# Patient Record
Sex: MALE | Race: ASIAN | URBAN - METROPOLITAN AREA
[De-identification: names, ages, dates, MRNs, and addresses within clinical notes are randomized per-mention and may not be internally consistent; named-entity substitution may affect disease eponyms.]

---

## 2023-05-22 ENCOUNTER — OFFICE VISIT (OUTPATIENT)
Dept: URBAN - METROPOLITAN AREA CLINIC 56 | Facility: LOCATION | Age: 68
End: 2023-05-22
Payer: MEDICARE

## 2023-05-22 DIAGNOSIS — Z96.1 PRESENCE OF INTRAOCULAR LENS: ICD-10-CM

## 2023-05-22 DIAGNOSIS — H05.242 CONSTANT EXOPHTHALMOS, LEFT EYE: ICD-10-CM

## 2023-05-22 DIAGNOSIS — H35.372 PUCKERING OF MACULA, LEFT EYE: ICD-10-CM

## 2023-05-22 DIAGNOSIS — H33.321 ROUND HOLE, RIGHT EYE: ICD-10-CM

## 2023-05-22 DIAGNOSIS — E11.3393 TYPE 2 DIAB W MODERATE NONPRLF DIAB RTNOP W/O MACULAR EDEMA, BILATERAL: Primary | ICD-10-CM

## 2023-05-22 DIAGNOSIS — I69.398 OTHER SEQUELAE OF CEREBRAL INFARCTION: ICD-10-CM

## 2023-05-22 DIAGNOSIS — H40.023 OPEN ANGLE WITH BORDERLINE FINDINGS, HIGH RISK, BILATERAL: ICD-10-CM

## 2023-05-22 DIAGNOSIS — H26.492 OTHER SECONDARY CATARACT, LEFT EYE: ICD-10-CM

## 2023-05-22 PROCEDURE — 99204 OFFICE O/P NEW MOD 45 MIN: CPT | Performed by: STUDENT IN AN ORGANIZED HEALTH CARE EDUCATION/TRAINING PROGRAM

## 2023-05-22 PROCEDURE — 92083 EXTENDED VISUAL FIELD XM: CPT | Performed by: STUDENT IN AN ORGANIZED HEALTH CARE EDUCATION/TRAINING PROGRAM

## 2023-05-22 PROCEDURE — 92134 CPTRZ OPH DX IMG PST SGM RTA: CPT | Performed by: STUDENT IN AN ORGANIZED HEALTH CARE EDUCATION/TRAINING PROGRAM

## 2023-05-22 ASSESSMENT — INTRAOCULAR PRESSURE
OD: 12
OS: 13

## 2023-05-22 ASSESSMENT — VISUAL ACUITY
OS: 20/50
OD: 20/25

## 2023-05-22 ASSESSMENT — KERATOMETRY
OS: 41.11
OD: 40.96

## 2023-05-22 NOTE — IMPRESSION/PLAN
Impression: Round hole, right eye: H33.321. Plan: operculated, pigmented 360 and asymptomatic. RD precautions discussed - call stat with new flashes/floaters/curtain.

## 2023-05-22 NOTE — IMPRESSION/PLAN
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 Plan: stroke 1 year ago with severe cognitive effects. HVF difficult but no apparent field defects.

## 2023-05-22 NOTE — IMPRESSION/PLAN
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 Donya: 18 / 20 Plan: h/o lid surgery, unsure which eye. OS appears proptotic, son has not noticed. Order MRI brain and orbits w/ and w/out contrast 
Start frequent ATs and lubricant faye QHS, tape lid shut RTC 6-8 weeks for IOP check

## 2023-05-22 NOTE — IMPRESSION/PLAN
Impression: Type 2 diab w moderate nonprlf diab rtnop w/o macular edema, bilateral: O38.3169. Plan: no DME on mac OCT, continue strict BG control. F/u with PCP as directed. Call with vision changes.

## 2023-05-22 NOTE — IMPRESSION/PLAN
Impression: Open angle with borderline findings, high risk, bilateral: H40.023. LRNFL (05/2023) LHVF (05/2023) Plan: low IOP OU, thinning on RNFL OU. No pattern defects on HVF, no apparent pallor. Possibly 2' to stroke. Discussed vision limitations.  Monitor without treatment for now - order imaging, IOP check next visit detailed exam